# Patient Record
Sex: FEMALE | Race: WHITE | NOT HISPANIC OR LATINO | Employment: PART TIME | URBAN - METROPOLITAN AREA
[De-identification: names, ages, dates, MRNs, and addresses within clinical notes are randomized per-mention and may not be internally consistent; named-entity substitution may affect disease eponyms.]

---

## 2023-02-20 PROBLEM — R73.01 IMPAIRED FASTING GLUCOSE: Status: ACTIVE | Noted: 2023-02-20

## 2023-02-20 PROBLEM — M81.0 POSTMENOPAUSAL BONE LOSS: Status: ACTIVE | Noted: 2023-02-20

## 2023-02-20 PROBLEM — N18.31 STAGE 3A CHRONIC KIDNEY DISEASE (MULTI): Status: ACTIVE | Noted: 2023-02-20

## 2023-02-20 PROBLEM — L65.9 HAIR LOSS: Status: ACTIVE | Noted: 2023-02-20

## 2023-02-20 PROBLEM — M25.551 HIP PAIN, BILATERAL: Status: ACTIVE | Noted: 2023-02-20

## 2023-02-20 PROBLEM — E78.5 HYPERLIPIDEMIA: Status: ACTIVE | Noted: 2023-02-20

## 2023-02-20 PROBLEM — M25.552 HIP PAIN, BILATERAL: Status: ACTIVE | Noted: 2023-02-20

## 2023-02-20 PROBLEM — E03.9 HYPOTHYROIDISM: Status: ACTIVE | Noted: 2023-02-20

## 2023-02-20 PROBLEM — M85.80 MODERATE OSTEOPENIA: Status: ACTIVE | Noted: 2023-02-20

## 2023-02-20 RX ORDER — PRAVASTATIN SODIUM 80 MG/1
1 TABLET ORAL DAILY
COMMUNITY
Start: 2014-03-10 | End: 2023-05-30 | Stop reason: SDUPTHER

## 2023-02-20 RX ORDER — LUTEIN 6 MG
TABLET ORAL
COMMUNITY
Start: 2015-04-14

## 2023-02-20 RX ORDER — OMEGA-3/DHA/EPA/FISH OIL 300-1000MG
CAPSULE,DELAYED RELEASE (ENTERIC COATED) ORAL
COMMUNITY
Start: 2015-04-14

## 2023-02-20 RX ORDER — SPIRONOLACTONE 100 MG/1
1 TABLET, FILM COATED ORAL 2 TIMES DAILY
COMMUNITY
Start: 2014-06-19 | End: 2023-05-12 | Stop reason: SDUPTHER

## 2023-02-20 RX ORDER — CALCIUM CARBONATE/VITAMIN D3 600MG-5MCG
TABLET ORAL
COMMUNITY
Start: 2015-04-14

## 2023-02-20 RX ORDER — LEVOTHYROXINE SODIUM 100 UG/1
TABLET ORAL
COMMUNITY
Start: 2015-04-14 | End: 2023-06-05 | Stop reason: SDUPTHER

## 2023-03-16 ENCOUNTER — OFFICE VISIT (OUTPATIENT)
Dept: PRIMARY CARE | Facility: CLINIC | Age: 73
End: 2023-03-16
Payer: MEDICARE

## 2023-03-16 VITALS
WEIGHT: 180 LBS | SYSTOLIC BLOOD PRESSURE: 132 MMHG | HEIGHT: 64 IN | BODY MASS INDEX: 30.73 KG/M2 | DIASTOLIC BLOOD PRESSURE: 78 MMHG | HEART RATE: 74 BPM

## 2023-03-16 DIAGNOSIS — Z00.00 ROUTINE GENERAL MEDICAL EXAMINATION AT HEALTH CARE FACILITY: ICD-10-CM

## 2023-03-16 DIAGNOSIS — E78.00 PURE HYPERCHOLESTEROLEMIA: Primary | ICD-10-CM

## 2023-03-16 DIAGNOSIS — R73.01 IMPAIRED FASTING GLUCOSE: ICD-10-CM

## 2023-03-16 DIAGNOSIS — E03.9 ACQUIRED HYPOTHYROIDISM: ICD-10-CM

## 2023-03-16 DIAGNOSIS — Z12.11 ENCOUNTER FOR SCREENING FOR MALIGNANT NEOPLASM OF COLON: ICD-10-CM

## 2023-03-16 DIAGNOSIS — N18.31 STAGE 3A CHRONIC KIDNEY DISEASE (MULTI): ICD-10-CM

## 2023-03-16 DIAGNOSIS — Z12.31 VISIT FOR SCREENING MAMMOGRAM: ICD-10-CM

## 2023-03-16 LAB
ALANINE AMINOTRANSFERASE (SGPT) (U/L) IN SER/PLAS: 24 U/L (ref 7–45)
ALBUMIN (G/DL) IN SER/PLAS: 4.4 G/DL (ref 3.4–5)
ALKALINE PHOSPHATASE (U/L) IN SER/PLAS: 78 U/L (ref 33–136)
ANION GAP IN SER/PLAS: 14 MMOL/L (ref 10–20)
ASPARTATE AMINOTRANSFERASE (SGOT) (U/L) IN SER/PLAS: 18 U/L (ref 9–39)
BASOPHILS (10*3/UL) IN BLOOD BY AUTOMATED COUNT: 0.07 X10E9/L (ref 0–0.1)
BASOPHILS/100 LEUKOCYTES IN BLOOD BY AUTOMATED COUNT: 1 % (ref 0–2)
BILIRUBIN TOTAL (MG/DL) IN SER/PLAS: 1.9 MG/DL (ref 0–1.2)
CALCIUM (MG/DL) IN SER/PLAS: 10.1 MG/DL (ref 8.6–10.3)
CARBON DIOXIDE, TOTAL (MMOL/L) IN SER/PLAS: 28 MMOL/L (ref 21–32)
CHLORIDE (MMOL/L) IN SER/PLAS: 103 MMOL/L (ref 98–107)
CHOLESTEROL (MG/DL) IN SER/PLAS: 166 MG/DL (ref 0–199)
CHOLESTEROL IN HDL (MG/DL) IN SER/PLAS: 46.9 MG/DL
CHOLESTEROL/HDL RATIO: 3.5
CREATINE KINASE (U/L) IN SER/PLAS: 44 U/L (ref 0–215)
CREATININE (MG/DL) IN SER/PLAS: 1.13 MG/DL (ref 0.5–1.05)
EOSINOPHILS (10*3/UL) IN BLOOD BY AUTOMATED COUNT: 0.17 X10E9/L (ref 0–0.4)
EOSINOPHILS/100 LEUKOCYTES IN BLOOD BY AUTOMATED COUNT: 2.5 % (ref 0–6)
ERYTHROCYTE DISTRIBUTION WIDTH (RATIO) BY AUTOMATED COUNT: 12.7 % (ref 11.5–14.5)
ERYTHROCYTE MEAN CORPUSCULAR HEMOGLOBIN CONCENTRATION (G/DL) BY AUTOMATED: 32 G/DL (ref 32–36)
ERYTHROCYTE MEAN CORPUSCULAR VOLUME (FL) BY AUTOMATED COUNT: 94 FL (ref 80–100)
ERYTHROCYTES (10*6/UL) IN BLOOD BY AUTOMATED COUNT: 5.31 X10E12/L (ref 4–5.2)
GFR FEMALE: 52 ML/MIN/1.73M2
GLUCOSE (MG/DL) IN SER/PLAS: 113 MG/DL (ref 74–99)
HEMATOCRIT (%) IN BLOOD BY AUTOMATED COUNT: 49.7 % (ref 36–46)
HEMOGLOBIN (G/DL) IN BLOOD: 15.9 G/DL (ref 12–16)
IMMATURE GRANULOCYTES/100 LEUKOCYTES IN BLOOD BY AUTOMATED COUNT: 0.4 % (ref 0–0.9)
LDL: 92 MG/DL (ref 0–99)
LEUKOCYTES (10*3/UL) IN BLOOD BY AUTOMATED COUNT: 6.7 X10E9/L (ref 4.4–11.3)
LYMPHOCYTES (10*3/UL) IN BLOOD BY AUTOMATED COUNT: 1.43 X10E9/L (ref 0.8–3)
LYMPHOCYTES/100 LEUKOCYTES IN BLOOD BY AUTOMATED COUNT: 21.4 % (ref 13–44)
MAGNESIUM (MG/DL) IN SER/PLAS: 2.35 MG/DL (ref 1.6–2.4)
MONOCYTES (10*3/UL) IN BLOOD BY AUTOMATED COUNT: 0.54 X10E9/L (ref 0.05–0.8)
MONOCYTES/100 LEUKOCYTES IN BLOOD BY AUTOMATED COUNT: 8.1 % (ref 2–10)
NEUTROPHILS (10*3/UL) IN BLOOD BY AUTOMATED COUNT: 4.45 X10E9/L (ref 1.6–5.5)
NEUTROPHILS/100 LEUKOCYTES IN BLOOD BY AUTOMATED COUNT: 66.6 % (ref 40–80)
PLATELETS (10*3/UL) IN BLOOD AUTOMATED COUNT: 234 X10E9/L (ref 150–450)
POTASSIUM (MMOL/L) IN SER/PLAS: 4.7 MMOL/L (ref 3.5–5.3)
PROTEIN TOTAL: 6.9 G/DL (ref 6.4–8.2)
SODIUM (MMOL/L) IN SER/PLAS: 140 MMOL/L (ref 136–145)
THYROTROPIN (MIU/L) IN SER/PLAS BY DETECTION LIMIT <= 0.05 MIU/L: 7.05 MIU/L (ref 0.44–3.98)
THYROXINE (T4) FREE (NG/DL) IN SER/PLAS: 1.22 NG/DL (ref 0.61–1.12)
TRIGLYCERIDE (MG/DL) IN SER/PLAS: 134 MG/DL (ref 0–149)
UREA NITROGEN (MG/DL) IN SER/PLAS: 14 MG/DL (ref 6–23)
VLDL: 27 MG/DL (ref 0–40)

## 2023-03-16 PROCEDURE — 99214 OFFICE O/P EST MOD 30 MIN: CPT | Performed by: INTERNAL MEDICINE

## 2023-03-16 PROCEDURE — G0439 PPPS, SUBSEQ VISIT: HCPCS | Performed by: INTERNAL MEDICINE

## 2023-03-16 PROCEDURE — 1036F TOBACCO NON-USER: CPT | Performed by: INTERNAL MEDICINE

## 2023-03-16 PROCEDURE — 1160F RVW MEDS BY RX/DR IN RCRD: CPT | Performed by: INTERNAL MEDICINE

## 2023-03-16 PROCEDURE — 1170F FXNL STATUS ASSESSED: CPT | Performed by: INTERNAL MEDICINE

## 2023-03-16 PROCEDURE — 1159F MED LIST DOCD IN RCRD: CPT | Performed by: INTERNAL MEDICINE

## 2023-03-16 ASSESSMENT — ACTIVITIES OF DAILY LIVING (ADL)
DOING_HOUSEWORK: INDEPENDENT
MANAGING_FINANCES: INDEPENDENT
BATHING: INDEPENDENT
TAKING_MEDICATION: INDEPENDENT
GROCERY_SHOPPING: INDEPENDENT
DRESSING: INDEPENDENT

## 2023-03-16 ASSESSMENT — PATIENT HEALTH QUESTIONNAIRE - PHQ9
2. FEELING DOWN, DEPRESSED OR HOPELESS: NOT AT ALL
SUM OF ALL RESPONSES TO PHQ9 QUESTIONS 1 AND 2: 0
1. LITTLE INTEREST OR PLEASURE IN DOING THINGS: NOT AT ALL

## 2023-03-16 NOTE — PROGRESS NOTES
"Subjective   Patient ID: Yanelis Lagunas is a 72 y.o. female who presents for Medicare Annual Wellness Visit Subsequent.    Her hair is not getting any thicker, is on spironolactone but not helping, can she use rogaine  Her sister also has hair loss  She has a laser she uses    Energy level is good  No cp or pressure  No sob or mills, she is not exercising like she used to due to her bursitis. She had to  line for 2 hours last weekend and her buttocks and hip hurt  Bowels are regular  No urinary issues.            Review of Systems    Objective   /78   Pulse 74   Ht 1.631 m (5' 4.2\")   Wt 81.6 kg (180 lb)   BMI 30.70 kg/m²     Physical Exam  Constitutional:       Appearance: Normal appearance.   HENT:      Head: Normocephalic.   Neck:      Vascular: No carotid bruit.   Cardiovascular:      Rate and Rhythm: Normal rate and regular rhythm.      Heart sounds: No murmur heard.  Musculoskeletal:      Cervical back: No tenderness.      Right lower leg: No edema.      Left lower leg: No edema.   Lymphadenopathy:      Cervical: No cervical adenopathy.   Skin:     General: Skin is warm and dry.   Neurological:      Mental Status: She is alert.   Psychiatric:         Mood and Affect: Mood normal.         Behavior: Behavior normal.         Thought Content: Thought content normal.         Assessment/Plan          "

## 2023-03-16 NOTE — PATIENT INSTRUCTIONS
Colon screen, check to see if you had stool test from insurer and if not, do the test given today    Call [phone] to schedule mammogram at [facility]    Bp is good    Hair loss, checking labs on spironolactone- discussed rogaine for women, if not better, to dermatology    Hypothyroid, get lab/tsh    Cholesterol, on pravastatin, checking labs    If labs good would recheck in 1 year    Declines all vaccinations

## 2023-03-17 LAB
ESTIMATED AVERAGE GLUCOSE FOR HBA1C: 123 MG/DL
HEMOGLOBIN A1C/HEMOGLOBIN TOTAL IN BLOOD: 5.9 %

## 2023-05-12 DIAGNOSIS — N18.31 STAGE 3A CHRONIC KIDNEY DISEASE (MULTI): ICD-10-CM

## 2023-05-12 NOTE — TELEPHONE ENCOUNTER
Requested Prescriptions     Pending Prescriptions Disp Refills    spironolactone (Aldactone) 100 mg tablet 180 tablet 1     Sig: Take 1 tablet (100 mg) by mouth 2 times a day.

## 2023-05-15 RX ORDER — SPIRONOLACTONE 100 MG/1
100 TABLET, FILM COATED ORAL 2 TIMES DAILY
Qty: 180 TABLET | Refills: 1 | Status: SHIPPED | OUTPATIENT
Start: 2023-05-15 | End: 2023-10-25

## 2023-05-30 DIAGNOSIS — E78.5 HYPERLIPIDEMIA, UNSPECIFIED HYPERLIPIDEMIA TYPE: ICD-10-CM

## 2023-05-30 NOTE — TELEPHONE ENCOUNTER
Requested Prescriptions     Pending Prescriptions Disp Refills    pravastatin (Pravachol) 80 mg tablet 90 tablet 2     Sig: Take 1 tablet (80 mg) by mouth once daily.

## 2023-05-31 RX ORDER — PRAVASTATIN SODIUM 80 MG/1
80 TABLET ORAL DAILY
Qty: 90 TABLET | Refills: 2 | Status: SHIPPED | OUTPATIENT
Start: 2023-05-31 | End: 2024-04-04 | Stop reason: SDUPTHER

## 2023-06-05 DIAGNOSIS — E03.9 HYPOTHYROIDISM, UNSPECIFIED TYPE: ICD-10-CM

## 2023-06-05 RX ORDER — LEVOTHYROXINE SODIUM 100 UG/1
100 TABLET ORAL SEE ADMIN INSTRUCTIONS
Qty: 94 TABLET | Refills: 1 | Status: SHIPPED | OUTPATIENT
Start: 2023-06-05 | End: 2023-11-17

## 2023-06-05 NOTE — TELEPHONE ENCOUNTER
Requested Prescriptions     Pending Prescriptions Disp Refills    levothyroxine (Synthroid, Levoxyl) 100 mcg tablet 94 tablet 1     Sig: Take 1 tablet (100 mcg) by mouth see administration instructions. take 1 tablet by mouth once daily except 1 day weekly just take 1/2 tab

## 2023-10-25 DIAGNOSIS — N18.31 STAGE 3A CHRONIC KIDNEY DISEASE (MULTI): ICD-10-CM

## 2023-10-25 RX ORDER — SPIRONOLACTONE 100 MG/1
100 TABLET, FILM COATED ORAL 2 TIMES DAILY
Qty: 180 TABLET | Refills: 1 | Status: SHIPPED | OUTPATIENT
Start: 2023-10-25 | End: 2024-05-14 | Stop reason: SDUPTHER

## 2023-11-16 DIAGNOSIS — E03.9 HYPOTHYROIDISM, UNSPECIFIED TYPE: ICD-10-CM

## 2023-11-17 RX ORDER — LEVOTHYROXINE SODIUM 100 UG/1
TABLET ORAL
Qty: 94 TABLET | Refills: 1 | Status: SHIPPED | OUTPATIENT
Start: 2023-11-17 | End: 2024-05-14 | Stop reason: SDUPTHER

## 2024-04-04 DIAGNOSIS — E78.5 HYPERLIPIDEMIA, UNSPECIFIED HYPERLIPIDEMIA TYPE: ICD-10-CM

## 2024-04-05 DIAGNOSIS — M81.0 POSTMENOPAUSAL BONE LOSS: ICD-10-CM

## 2024-04-05 DIAGNOSIS — N18.31 STAGE 3A CHRONIC KIDNEY DISEASE (MULTI): ICD-10-CM

## 2024-04-05 DIAGNOSIS — E03.9 ACQUIRED HYPOTHYROIDISM: ICD-10-CM

## 2024-04-05 DIAGNOSIS — E78.00 PURE HYPERCHOLESTEROLEMIA: ICD-10-CM

## 2024-04-05 DIAGNOSIS — R73.01 IMPAIRED FASTING GLUCOSE: Primary | ICD-10-CM

## 2024-04-05 RX ORDER — PRAVASTATIN SODIUM 80 MG/1
80 TABLET ORAL DAILY
Qty: 90 TABLET | Refills: 0 | Status: SHIPPED | OUTPATIENT
Start: 2024-04-05

## 2024-05-14 DIAGNOSIS — E03.9 HYPOTHYROIDISM, UNSPECIFIED TYPE: ICD-10-CM

## 2024-05-14 DIAGNOSIS — N18.31 STAGE 3A CHRONIC KIDNEY DISEASE (MULTI): ICD-10-CM

## 2024-05-14 RX ORDER — SPIRONOLACTONE 100 MG/1
100 TABLET, FILM COATED ORAL 2 TIMES DAILY
Qty: 180 TABLET | Refills: 2 | Status: SHIPPED | OUTPATIENT
Start: 2024-05-14 | End: 2024-05-23

## 2024-05-14 RX ORDER — LEVOTHYROXINE SODIUM 100 UG/1
TABLET ORAL
Qty: 94 TABLET | Refills: 2 | Status: SHIPPED | OUTPATIENT
Start: 2024-05-14

## 2024-05-14 NOTE — TELEPHONE ENCOUNTER
Requested Prescriptions     Pending Prescriptions Disp Refills    spironolactone (Aldactone) 100 mg tablet 180 tablet 2     Sig: Take 1 tablet (100 mg) by mouth 2 times a day.    levothyroxine (Synthroid, Levoxyl) 100 mcg tablet 94 tablet 2     Sig: SEE ADMINISTRATION INSTRUCTIONS. TAKE 1 TABLET BY MOUTH ONCE DAILY EXCEPT 1 DAY WEEKLY JUST TAKE 1/2 TABLET.

## 2024-05-21 ENCOUNTER — LAB (OUTPATIENT)
Dept: LAB | Facility: LAB | Age: 74
End: 2024-05-21
Payer: MEDICARE

## 2024-05-21 DIAGNOSIS — R73.01 IMPAIRED FASTING GLUCOSE: ICD-10-CM

## 2024-05-21 DIAGNOSIS — N18.31 STAGE 3A CHRONIC KIDNEY DISEASE (MULTI): ICD-10-CM

## 2024-05-21 DIAGNOSIS — E03.9 ACQUIRED HYPOTHYROIDISM: ICD-10-CM

## 2024-05-21 DIAGNOSIS — M81.0 POSTMENOPAUSAL BONE LOSS: ICD-10-CM

## 2024-05-21 DIAGNOSIS — E78.00 PURE HYPERCHOLESTEROLEMIA: ICD-10-CM

## 2024-05-21 LAB
25(OH)D3 SERPL-MCNC: 59 NG/ML (ref 30–100)
ALBUMIN SERPL BCP-MCNC: 4.4 G/DL (ref 3.4–5)
ALP SERPL-CCNC: 67 U/L (ref 33–136)
ALT SERPL W P-5'-P-CCNC: 27 U/L (ref 7–45)
ANION GAP SERPL CALC-SCNC: 15 MMOL/L (ref 10–20)
AST SERPL W P-5'-P-CCNC: 21 U/L (ref 9–39)
BASOPHILS # BLD AUTO: 0.08 X10*3/UL (ref 0–0.1)
BASOPHILS NFR BLD AUTO: 1.3 %
BILIRUB SERPL-MCNC: 1.9 MG/DL (ref 0–1.2)
BUN SERPL-MCNC: 14 MG/DL (ref 6–23)
CALCIUM SERPL-MCNC: 9.8 MG/DL (ref 8.6–10.3)
CHLORIDE SERPL-SCNC: 105 MMOL/L (ref 98–107)
CHOLEST SERPL-MCNC: 150 MG/DL (ref 0–199)
CHOLESTEROL/HDL RATIO: 3.3
CK SERPL-CCNC: 44 U/L (ref 0–215)
CO2 SERPL-SCNC: 24 MMOL/L (ref 21–32)
CREAT SERPL-MCNC: 1.1 MG/DL (ref 0.5–1.05)
EGFRCR SERPLBLD CKD-EPI 2021: 53 ML/MIN/1.73M*2
EOSINOPHIL # BLD AUTO: 0.39 X10*3/UL (ref 0–0.4)
EOSINOPHIL NFR BLD AUTO: 6.3 %
ERYTHROCYTE [DISTWIDTH] IN BLOOD BY AUTOMATED COUNT: 12.3 % (ref 11.5–14.5)
EST. AVERAGE GLUCOSE BLD GHB EST-MCNC: 126 MG/DL
GLUCOSE SERPL-MCNC: 118 MG/DL (ref 74–99)
HBA1C MFR BLD: 6 %
HCT VFR BLD AUTO: 47.9 % (ref 36–46)
HDLC SERPL-MCNC: 45.5 MG/DL
HGB BLD-MCNC: 15.8 G/DL (ref 12–16)
IMM GRANULOCYTES # BLD AUTO: 0.04 X10*3/UL (ref 0–0.5)
IMM GRANULOCYTES NFR BLD AUTO: 0.6 % (ref 0–0.9)
LDLC SERPL CALC-MCNC: 80 MG/DL
LYMPHOCYTES # BLD AUTO: 1.57 X10*3/UL (ref 0.8–3)
LYMPHOCYTES NFR BLD AUTO: 25.4 %
MAGNESIUM SERPL-MCNC: 2.3 MG/DL (ref 1.6–2.4)
MCH RBC QN AUTO: 30.3 PG (ref 26–34)
MCHC RBC AUTO-ENTMCNC: 33 G/DL (ref 32–36)
MCV RBC AUTO: 92 FL (ref 80–100)
MONOCYTES # BLD AUTO: 0.59 X10*3/UL (ref 0.05–0.8)
MONOCYTES NFR BLD AUTO: 9.5 %
NEUTROPHILS # BLD AUTO: 3.51 X10*3/UL (ref 1.6–5.5)
NEUTROPHILS NFR BLD AUTO: 56.9 %
NON HDL CHOLESTEROL: 105 MG/DL (ref 0–149)
NRBC BLD-RTO: 0 /100 WBCS (ref 0–0)
PLATELET # BLD AUTO: 232 X10*3/UL (ref 150–450)
POTASSIUM SERPL-SCNC: 4.2 MMOL/L (ref 3.5–5.3)
PROT SERPL-MCNC: 6.7 G/DL (ref 6.4–8.2)
RBC # BLD AUTO: 5.21 X10*6/UL (ref 4–5.2)
SODIUM SERPL-SCNC: 140 MMOL/L (ref 136–145)
TRIGL SERPL-MCNC: 124 MG/DL (ref 0–149)
TSH SERPL-ACNC: 1.98 MIU/L (ref 0.44–3.98)
VLDL: 25 MG/DL (ref 0–40)
WBC # BLD AUTO: 6.2 X10*3/UL (ref 4.4–11.3)

## 2024-05-21 PROCEDURE — 84443 ASSAY THYROID STIM HORMONE: CPT

## 2024-05-21 PROCEDURE — 83735 ASSAY OF MAGNESIUM: CPT

## 2024-05-21 PROCEDURE — 82550 ASSAY OF CK (CPK): CPT

## 2024-05-21 PROCEDURE — 80053 COMPREHEN METABOLIC PANEL: CPT

## 2024-05-21 PROCEDURE — 80061 LIPID PANEL: CPT

## 2024-05-21 PROCEDURE — 82306 VITAMIN D 25 HYDROXY: CPT

## 2024-05-21 PROCEDURE — 83036 HEMOGLOBIN GLYCOSYLATED A1C: CPT

## 2024-05-21 PROCEDURE — 36415 COLL VENOUS BLD VENIPUNCTURE: CPT

## 2024-05-21 PROCEDURE — 85025 COMPLETE CBC W/AUTO DIFF WBC: CPT

## 2024-05-23 ENCOUNTER — OFFICE VISIT (OUTPATIENT)
Dept: PRIMARY CARE | Facility: CLINIC | Age: 74
End: 2024-05-23
Payer: MEDICARE

## 2024-05-23 VITALS
DIASTOLIC BLOOD PRESSURE: 86 MMHG | HEART RATE: 66 BPM | BODY MASS INDEX: 30.73 KG/M2 | HEIGHT: 64 IN | WEIGHT: 180 LBS | SYSTOLIC BLOOD PRESSURE: 127 MMHG

## 2024-05-23 DIAGNOSIS — M25.551 RIGHT HIP PAIN: ICD-10-CM

## 2024-05-23 DIAGNOSIS — E78.00 PURE HYPERCHOLESTEROLEMIA: ICD-10-CM

## 2024-05-23 DIAGNOSIS — M81.0 POSTMENOPAUSAL BONE LOSS: ICD-10-CM

## 2024-05-23 DIAGNOSIS — Z00.00 ROUTINE GENERAL MEDICAL EXAMINATION AT HEALTH CARE FACILITY: Primary | ICD-10-CM

## 2024-05-23 DIAGNOSIS — Z12.31 VISIT FOR SCREENING MAMMOGRAM: ICD-10-CM

## 2024-05-23 DIAGNOSIS — R73.01 IMPAIRED FASTING GLUCOSE: ICD-10-CM

## 2024-05-23 DIAGNOSIS — N18.31 CKD STAGE 3A, GFR 45-59 ML/MIN (MULTI): ICD-10-CM

## 2024-05-23 DIAGNOSIS — N18.31 STAGE 3A CHRONIC KIDNEY DISEASE (MULTI): ICD-10-CM

## 2024-05-23 DIAGNOSIS — E03.9 ACQUIRED HYPOTHYROIDISM: ICD-10-CM

## 2024-05-23 DIAGNOSIS — R60.0 PEDAL EDEMA: ICD-10-CM

## 2024-05-23 LAB
POC APPEARANCE, URINE: CLEAR
POC BILIRUBIN, URINE: NEGATIVE
POC BLOOD, URINE: NEGATIVE
POC COLOR, URINE: YELLOW
POC GLUCOSE, URINE: NEGATIVE MG/DL
POC KETONES, URINE: NEGATIVE MG/DL
POC LEUKOCYTES, URINE: ABNORMAL
POC NITRITE,URINE: NEGATIVE
POC PH, URINE: 6 PH
POC PROTEIN, URINE: NEGATIVE MG/DL
POC SPECIFIC GRAVITY, URINE: 1.02
POC UROBILINOGEN, URINE: 0.2 EU/DL

## 2024-05-23 PROCEDURE — 81003 URINALYSIS AUTO W/O SCOPE: CPT | Performed by: INTERNAL MEDICINE

## 2024-05-23 PROCEDURE — 1170F FXNL STATUS ASSESSED: CPT | Performed by: INTERNAL MEDICINE

## 2024-05-23 PROCEDURE — 1036F TOBACCO NON-USER: CPT | Performed by: INTERNAL MEDICINE

## 2024-05-23 PROCEDURE — 93000 ELECTROCARDIOGRAM COMPLETE: CPT | Performed by: INTERNAL MEDICINE

## 2024-05-23 PROCEDURE — 1160F RVW MEDS BY RX/DR IN RCRD: CPT | Performed by: INTERNAL MEDICINE

## 2024-05-23 PROCEDURE — 1159F MED LIST DOCD IN RCRD: CPT | Performed by: INTERNAL MEDICINE

## 2024-05-23 PROCEDURE — G0439 PPPS, SUBSEQ VISIT: HCPCS | Performed by: INTERNAL MEDICINE

## 2024-05-23 PROCEDURE — 99214 OFFICE O/P EST MOD 30 MIN: CPT | Performed by: INTERNAL MEDICINE

## 2024-05-23 PROCEDURE — 1124F ACP DISCUSS-NO DSCNMKR DOCD: CPT | Performed by: INTERNAL MEDICINE

## 2024-05-23 RX ORDER — SPIRONOLACTONE 100 MG/1
100 TABLET, FILM COATED ORAL DAILY
Start: 2024-05-23

## 2024-05-23 ASSESSMENT — ACTIVITIES OF DAILY LIVING (ADL)
DOING_HOUSEWORK: INDEPENDENT
BATHING: INDEPENDENT
DRESSING: INDEPENDENT
TAKING_MEDICATION: INDEPENDENT
GROCERY_SHOPPING: INDEPENDENT
MANAGING_FINANCES: INDEPENDENT

## 2024-05-23 ASSESSMENT — PATIENT HEALTH QUESTIONNAIRE - PHQ9: 2. FEELING DOWN, DEPRESSED OR HOPELESS: NOT AT ALL

## 2024-05-23 NOTE — PROGRESS NOTES
"Subjective   Reason for Visit: Yanelis Lagunas is an 73 y.o. female here for a Medicare Wellness visit.     Past Medical, Surgical, and Family History reviewed and updated in chart.    Reviewed all medications by prescribing practitioner or clinical pharmacist (such as prescriptions, OTCs, herbal therapies and supplements) and documented in the medical record.    She has thin skin, is there anything she can do for it  What about estrogen  She gets bruisy spots on her hands    She gets bursitis and it gets worse on the right side  Now that she is more active. Is better in her right hip    She has edema in her feet, gets worse.   Has only been like that in the last month  She did cut grass and her arms have been more itchy  Her nose has been more congested.     She does not salt anything but eating more pepperoni, more on her left  No history of blood clot    No cp or pressure  Does her yard work  No LH or palpitations  Bowels are regular  No urinary issues  No sob  Mood is good.   She does a lot of research for her dghtr. And nutrition.     She doesn't want any shots, she does not want to put that stuff in her.             Patient Care Team:  Maryan Roberts DO as PCP - General  Maryan Roberts DO as PCP - [employer] PCP     Review of Systems    Objective   Vitals:  /86   Pulse 66   Ht 1.631 m (5' 4.2\")   Wt 81.6 kg (180 lb)   BMI 30.70 kg/m²       Physical Exam  Constitutional:       Appearance: Normal appearance.   HENT:      Nose:      Comments: Turbs mildly pale and edematous, no purulence  Neck:      Vascular: No carotid bruit.   Cardiovascular:      Rate and Rhythm: Normal rate. Rhythm irregular.      Heart sounds: No murmur heard.  Pulmonary:      Effort: Pulmonary effort is normal.      Breath sounds: Normal breath sounds.   Chest:   Breasts:     Right: No mass.      Left: No mass.   Abdominal:      General: Abdomen is flat.      Palpations: Abdomen is soft. There is no mass. "      Tenderness: There is no abdominal tenderness. There is no guarding.      Comments: No HSM or pulsatile mass   Musculoskeletal:      Right lower leg: Edema present.      Left lower leg: Edema (2-3+ pedal edema) present.      Comments: Hip/ART  Marked decrease ext rotation on the right to about 30deg, left is normal to about 80   Lymphadenopathy:      Cervical: No cervical adenopathy.      Upper Body:      Right upper body: No supraclavicular or axillary adenopathy.      Left upper body: No supraclavicular or axillary adenopathy.      Lower Body: No right inguinal adenopathy. No left inguinal adenopathy.   Skin:     Coloration: Skin is not jaundiced or pale.   Neurological:      Mental Status: She is alert and oriented to person, place, and time.   Psychiatric:         Mood and Affect: Mood normal.         Behavior: Behavior normal.         Assessment/Plan   Problem List Items Addressed This Visit    None  Visit Diagnoses       Routine general medical examination at health care facility    -  Primary            Patient Instructions   Leg swelling  Checking EKG to make sure no afib (heart is slightly irregular)  Checking urine for protein  Wear compression hose  If not better to see vascular/vein doc    Thin skin, no treatment, make sure taking in protein    Hair loss,   On spironolactone 100mg twice daily but is drying out your kidneys, cut to 1 daily    Call [phone] to schedule mammogram and bone density    Colon screen, getting annual stool test from insurer, was normal in September 2023    Hypothyroidism, tsh normal, continue same dose    Prediabetes- stable, continue diet and exercise    High cholesterol is well controlled, continue pravastatin    Right hip pain, on exam is consistent with arthritis as it does not rotate out, but not limiting your functioning, if getting worse would get xray and have you see orthopedics.     Declines vaccinations    Recheck in 1 year and as needed       Bilateral expiratory wheeze.  Good air movement however.  No retractions.  Speaking full sentences, and singing.  No other focal signs of infection.  Jack Knutson DO. Bilateral expiratory wheeze.  Good air movement however.  No retractions.  Speaking full sentences, and singing.  No other focal signs of infection.  Tachycardic, regular rhythm, no murmur. Jack Knutson DO.

## 2024-05-23 NOTE — PROGRESS NOTES
Subjective   Patient ID: Yanelis Lagunas is a 73 y.o. female who presents for Medicare Annual Wellness Visit Subsequent.    HPI     Review of Systems    Objective   There were no vitals taken for this visit.    Physical Exam    Assessment/Plan

## 2024-05-23 NOTE — ASSESSMENT & PLAN NOTE
Chronic, stable, renal ultrasound normal. Due to spironolactone, decreasing to 100/day, check annually

## 2024-05-23 NOTE — PATIENT INSTRUCTIONS
Leg swelling  Checking EKG to make sure no afib (heart is slightly irregular)  Checking urine for protein  Wear compression hose  If not better to see vascular/vein doc    Thin skin, no treatment, make sure taking in protein    Hair loss,   On spironolactone 100mg twice daily but is drying out your kidneys, cut to 1 daily    Call [phone] to schedule mammogram and bone density    Colon screen, getting annual stool test from insurer, was normal in September 2023    Hypothyroidism, tsh normal, continue same dose    Prediabetes- stable, continue diet and exercise    High cholesterol is well controlled, continue pravastatin    Right hip pain, on exam is consistent with arthritis as it does not rotate out, but not limiting your functioning, if getting worse would get xray and have you see orthopedics.     Declines vaccinations    Recheck in 1 year and as needed

## 2024-06-26 DIAGNOSIS — E78.5 HYPERLIPIDEMIA, UNSPECIFIED HYPERLIPIDEMIA TYPE: ICD-10-CM

## 2024-06-27 RX ORDER — PRAVASTATIN SODIUM 80 MG/1
80 TABLET ORAL DAILY
Qty: 90 TABLET | Refills: 1 | Status: SHIPPED | OUTPATIENT
Start: 2024-06-27

## 2024-07-04 DIAGNOSIS — E03.9 HYPOTHYROIDISM, UNSPECIFIED TYPE: ICD-10-CM

## 2024-07-05 RX ORDER — LEVOTHYROXINE SODIUM 100 UG/1
TABLET ORAL
Qty: 94 TABLET | Refills: 1 | Status: SHIPPED | OUTPATIENT
Start: 2024-07-05

## 2024-07-16 ENCOUNTER — HOSPITAL ENCOUNTER (OUTPATIENT)
Dept: RADIOLOGY | Facility: HOSPITAL | Age: 74
Discharge: HOME | End: 2024-07-16
Payer: MEDICARE

## 2024-07-16 VITALS — BODY MASS INDEX: 30.56 KG/M2 | WEIGHT: 179 LBS | HEIGHT: 64 IN

## 2024-07-16 DIAGNOSIS — M81.0 POSTMENOPAUSAL BONE LOSS: ICD-10-CM

## 2024-07-16 DIAGNOSIS — Z12.31 VISIT FOR SCREENING MAMMOGRAM: ICD-10-CM

## 2024-07-16 PROCEDURE — 77067 SCR MAMMO BI INCL CAD: CPT | Performed by: RADIOLOGY

## 2024-07-16 PROCEDURE — 77063 BREAST TOMOSYNTHESIS BI: CPT

## 2024-07-16 PROCEDURE — 77063 BREAST TOMOSYNTHESIS BI: CPT | Performed by: RADIOLOGY

## 2024-07-16 PROCEDURE — 77080 DXA BONE DENSITY AXIAL: CPT

## 2024-07-16 ASSESSMENT — LIFESTYLE VARIABLES
CURRENT_SMOKER: N
3_OR_MORE_DRINKS_PER_DAY: N

## 2024-09-17 DIAGNOSIS — N18.31 STAGE 3A CHRONIC KIDNEY DISEASE (MULTI): ICD-10-CM

## 2024-09-17 RX ORDER — SPIRONOLACTONE 100 MG/1
100 TABLET, FILM COATED ORAL 2 TIMES DAILY
Qty: 180 TABLET | Refills: 1 | Status: SHIPPED | OUTPATIENT
Start: 2024-09-17

## 2024-12-18 DIAGNOSIS — E78.5 HYPERLIPIDEMIA, UNSPECIFIED HYPERLIPIDEMIA TYPE: ICD-10-CM

## 2024-12-19 RX ORDER — PRAVASTATIN SODIUM 80 MG/1
80 TABLET ORAL DAILY
Qty: 90 TABLET | Refills: 1 | Status: SHIPPED | OUTPATIENT
Start: 2024-12-19

## 2025-03-27 NOTE — PROGRESS NOTES
Subjective   Reason for Visit: Yanelis Lagunas is an 74 y.o. female here for a annual [facility] visit and medicare wellness.           HPI  Yanelis       Visit date not found    Review of current and chronic medical health conditions:    #Hypothyroid  TSH 1.98 5/21/24  Levothyroxine 100 mcg daily    #Hyperlipidemia  Pravastatin 80 mg daily  LDL 80 5/21/24    #CKD stage 3a    eGFR 53  5/21/24  Negative proteinuria 5/21/24    Right hip pain  Xray and ortho if still bothers        Declines vaccinations     Recheck in 1 year and as needed   #CPE   Occupation: retired      Do you take any herbs or supplements that were not prescribed by a doctor?   Are you taking calcium supplements?   Are you taking aspirin daily?    Colon Cancer Screening: next colonoscopy 2030        Mammogram: order       Last eye exam: December    Last dental Exam: few years  Exercise:stuff around house, cut grows  Mood: good  Sleep: good  Vaccines: does not get any-declines      Health Maintenance Due   Topic Date Due   • Hepatitis C Screening  Never done   • CKD: Urine Protein Screening  Never done   • DTaP/Tdap/Td Vaccines (1 - Tdap) Never done   • Pneumococcal Vaccine (1 of 1 - PCV) Never done   • Zoster Vaccines (1 of 2) Never done   • Influenza Vaccine (1) Never done   • COVID-19 Vaccine (1 - 2024-25 season) Never done   • TSH Level  05/21/2025   • Diabetes: Hemoglobin A1C  05/21/2025   • Medicare Annual Wellness Visit (AWV)  05/24/2025       No Known Allergies            No visits with results within 60 Day(s) from this visit.   Latest known visit with results is:   Office Visit on 05/23/2024   Component Date Value Ref Range Status   • POC Color, Urine 05/23/2024 Yellow  Straw, Yellow, Light-Yellow Final   • POC Appearance, Urine 05/23/2024 Clear  Clear Final   • POC Glucose, Urine 05/23/2024 NEGATIVE  NEGATIVE mg/dl Final   • POC Bilirubin, Urine 05/23/2024 NEGATIVE  NEGATIVE Final   • POC Ketones, Urine 05/23/2024 NEGATIVE  NEGATIVE  mg/dl Final   • POC Specific Gravity, Urine 05/23/2024 1.025  1.005 - 1.035 Final   • POC Blood, Urine 05/23/2024 NEGATIVE  NEGATIVE Final   • POC PH, Urine 05/23/2024 6.0  No Reference Range Established PH Final   • POC Protein, Urine 05/23/2024 NEGATIVE  NEGATIVE, 30 (1+) mg/dl Final   • POC Urobilinogen, Urine 05/23/2024 0.2  0.2, 1.0 EU/DL Final   • Poc Nitrite, Urine 05/23/2024 NEGATIVE  NEGATIVE Final   • POC Leukocytes, Urine 05/23/2024 TRACE (A)  NEGATIVE Final         Patient Care Team:  MADY Martinez-CNP as PCP - General (Internal Medicine)  Maryan Roberts DO as PCP - [employer] PCP     Review of Systems    Objective   Vitals:  Pulse 54   Temp 36.8 °C (98.3 °F)   Wt 81.1 kg (178 lb 12.8 oz)   SpO2 98%   BMI 30.69 kg/m²       Past Surgical History:   Procedure Laterality Date   • BREAST BIOPSY  04/14/2015    Biopsy Breast Open   • TUBAL LIGATION  04/14/2015    Tubal Ligation       Current Outpatient Medications   Medication Instructions   • calcium carbonate-vitamin D3 600 mg-5 mcg (200 unit) tablet Take by mouth.   • eye vitamin supplement (Ocuvite Eye Health Formula) capsule 1 capsule, Daily   • folic acid/multivit-min/lutein (CENTRUM SILVER ORAL) Take by mouth.   • levothyroxine (Synthroid, Levoxyl) 100 mcg tablet TAKE 1 TABLET BY MOUTH ONCE DAILY EXCEPT 1 DAY WEEKLY JUST TAKE 1/2 TABLET.   • omega 3-dha-epa-fish oil 300-1,000 mg capsule,delayed release(DR/EC) Take by mouth.   • pravastatin (PRAVACHOL) 80 mg, oral, Daily   • spironolactone (ALDACTONE) 100 mg, oral, 2 times daily   • vitamin E acid succinate (vitamin E succinate) 268 mg (400 unit) tablet Take by mouth.       Physical Exam    Assessment & Plan  Routine general medical examination at health care facility         Right hip pain         CKD stage 3a, GFR 45-59 ml/min (Multi)         Visit for screening mammogram

## 2025-03-28 ENCOUNTER — APPOINTMENT (OUTPATIENT)
Dept: PRIMARY CARE | Facility: CLINIC | Age: 75
End: 2025-03-28
Payer: MEDICARE

## 2025-03-28 VITALS
WEIGHT: 178.8 LBS | OXYGEN SATURATION: 98 % | BODY MASS INDEX: 30.69 KG/M2 | HEART RATE: 54 BPM | DIASTOLIC BLOOD PRESSURE: 80 MMHG | TEMPERATURE: 98.3 F | SYSTOLIC BLOOD PRESSURE: 146 MMHG

## 2025-03-28 DIAGNOSIS — E03.8 OTHER SPECIFIED HYPOTHYROIDISM: ICD-10-CM

## 2025-03-28 DIAGNOSIS — N18.31 CKD STAGE 3A, GFR 45-59 ML/MIN (MULTI): ICD-10-CM

## 2025-03-28 DIAGNOSIS — M25.551 RIGHT HIP PAIN: ICD-10-CM

## 2025-03-28 DIAGNOSIS — E78.5 HYPERLIPIDEMIA, UNSPECIFIED HYPERLIPIDEMIA TYPE: ICD-10-CM

## 2025-03-28 DIAGNOSIS — Z12.31 ENCOUNTER FOR SCREENING MAMMOGRAM FOR BREAST CANCER: ICD-10-CM

## 2025-03-28 DIAGNOSIS — Z12.12 SCREENING FOR COLORECTAL CANCER: ICD-10-CM

## 2025-03-28 DIAGNOSIS — Z12.31 VISIT FOR SCREENING MAMMOGRAM: ICD-10-CM

## 2025-03-28 DIAGNOSIS — Z00.00 ROUTINE GENERAL MEDICAL EXAMINATION AT HEALTH CARE FACILITY: Primary | ICD-10-CM

## 2025-03-28 DIAGNOSIS — Z13.220 SCREENING FOR HYPERLIPIDEMIA: ICD-10-CM

## 2025-03-28 DIAGNOSIS — N18.31 STAGE 3A CHRONIC KIDNEY DISEASE (MULTI): ICD-10-CM

## 2025-03-28 DIAGNOSIS — Z12.11 SCREENING FOR COLORECTAL CANCER: ICD-10-CM

## 2025-03-28 DIAGNOSIS — Z13.1 DIABETES MELLITUS SCREENING: ICD-10-CM

## 2025-03-28 LAB
ALBUMIN SERPL-MCNC: 4.6 G/DL (ref 3.6–5.1)
ALBUMIN/CREAT UR: NORMAL
ALP SERPL-CCNC: 71 U/L (ref 37–153)
ALT SERPL-CCNC: 25 U/L (ref 6–29)
ANION GAP SERPL CALCULATED.4IONS-SCNC: 11 MMOL/L (CALC) (ref 7–17)
AST SERPL-CCNC: 20 U/L (ref 10–35)
BILIRUB SERPL-MCNC: 1.8 MG/DL (ref 0.2–1.2)
BUN SERPL-MCNC: 15 MG/DL (ref 7–25)
CALCIUM SERPL-MCNC: 10 MG/DL (ref 8.6–10.4)
CHLORIDE SERPL-SCNC: 102 MMOL/L (ref 98–110)
CHOLEST SERPL-MCNC: 158 MG/DL
CHOLEST/HDLC SERPL: 3.2 (CALC)
CO2 SERPL-SCNC: 26 MMOL/L (ref 20–32)
CREAT SERPL-MCNC: 1.09 MG/DL (ref 0.6–1)
CREAT UR-MCNC: NORMAL MG/DL
EGFRCR SERPLBLD CKD-EPI 2021: 54 ML/MIN/1.73M2
GLUCOSE SERPL-MCNC: 116 MG/DL (ref 65–99)
HDLC SERPL-MCNC: 50 MG/DL
LDLC SERPL CALC-MCNC: 86 MG/DL (CALC)
MICROALBUMIN UR-MCNC: NORMAL
NONHDLC SERPL-MCNC: 108 MG/DL (CALC)
POTASSIUM SERPL-SCNC: 4.5 MMOL/L (ref 3.5–5.3)
PROT SERPL-MCNC: 7.2 G/DL (ref 6.1–8.1)
SODIUM SERPL-SCNC: 139 MMOL/L (ref 135–146)
TRIGL SERPL-MCNC: 127 MG/DL

## 2025-03-28 PROCEDURE — 1036F TOBACCO NON-USER: CPT | Performed by: NURSE PRACTITIONER

## 2025-03-28 PROCEDURE — G2211 COMPLEX E/M VISIT ADD ON: HCPCS | Performed by: NURSE PRACTITIONER

## 2025-03-28 PROCEDURE — 1124F ACP DISCUSS-NO DSCNMKR DOCD: CPT | Performed by: NURSE PRACTITIONER

## 2025-03-28 PROCEDURE — 1170F FXNL STATUS ASSESSED: CPT | Performed by: NURSE PRACTITIONER

## 2025-03-28 PROCEDURE — 1160F RVW MEDS BY RX/DR IN RCRD: CPT | Performed by: NURSE PRACTITIONER

## 2025-03-28 PROCEDURE — 1159F MED LIST DOCD IN RCRD: CPT | Performed by: NURSE PRACTITIONER

## 2025-03-28 PROCEDURE — 99214 OFFICE O/P EST MOD 30 MIN: CPT | Performed by: NURSE PRACTITIONER

## 2025-03-28 RX ORDER — SPIRONOLACTONE 100 MG/1
100 TABLET, FILM COATED ORAL 2 TIMES DAILY
Qty: 180 TABLET | Refills: 1 | Status: SHIPPED | OUTPATIENT
Start: 2025-03-28

## 2025-03-28 RX ORDER — PRAVASTATIN SODIUM 80 MG/1
80 TABLET ORAL DAILY
Qty: 90 TABLET | Refills: 1 | Status: SHIPPED | OUTPATIENT
Start: 2025-03-28

## 2025-03-28 ASSESSMENT — ENCOUNTER SYMPTOMS
ABDOMINAL PAIN: 0
ACTIVITY CHANGE: 0
FEVER: 0
BLOOD IN STOOL: 0
DIFFICULTY URINATING: 0
COUGH: 0
CHILLS: 0
DIZZINESS: 0
WEAKNESS: 0
SEIZURES: 0
SPEECH DIFFICULTY: 0
NAUSEA: 0
CONSTIPATION: 0
DECREASED CONCENTRATION: 0
MYALGIAS: 0
NUMBNESS: 0
SHORTNESS OF BREATH: 0
ARTHRALGIAS: 1
POLYPHAGIA: 0
FLANK PAIN: 0
DIARRHEA: 0
CHEST TIGHTNESS: 0
VOMITING: 0
RECTAL PAIN: 0
PALPITATIONS: 0
APPETITE CHANGE: 0
POLYDIPSIA: 0
FATIGUE: 0

## 2025-03-28 ASSESSMENT — ACTIVITIES OF DAILY LIVING (ADL)
MANAGING_FINANCES: INDEPENDENT
DOING_HOUSEWORK: INDEPENDENT
DRESSING: INDEPENDENT
BATHING: INDEPENDENT
TAKING_MEDICATION: INDEPENDENT
GROCERY_SHOPPING: INDEPENDENT

## 2025-03-28 ASSESSMENT — PATIENT HEALTH QUESTIONNAIRE - PHQ9
2. FEELING DOWN, DEPRESSED OR HOPELESS: NOT AT ALL
1. LITTLE INTEREST OR PLEASURE IN DOING THINGS: NOT AT ALL
SUM OF ALL RESPONSES TO PHQ9 QUESTIONS 1 AND 2: 0

## 2025-03-28 NOTE — ASSESSMENT & PLAN NOTE
eGFR 53  5/21/24  Negative proteinuria 5/21/24    Has been taking aldactone 100 mg twice a day    Minimal edema on physical exam  Reported BP control at home 124 80  Will order labs today and based on results:    Consider adding ace/arb -renal protective , adjusting aldactone dose    Orders:    Urine Microalbumin - Lab collect; Future

## 2025-03-28 NOTE — PROGRESS NOTES
Subjective   Reason for Visit: Yanelis Lagunas is an 74 y.o. female here for a Medicare Wellness visit and routine annual exam. . Overall feels well. Active watching grandchildren. BP at home 124/80, leg swelling controlled with twice daily spironolactone, occasional hip discomfort- endorses not bothersome enough to take anything or prevent ADL. Declines colonoscopy- states has never had and does not want. Reviewed risk and benefit. Declines. Agrees to cologuard. Thyroid has been controlled with current dose of medication, denies symptoms. Denies cp, sob, dizziness, cough, change in appetite or activity.    Past Medical, Surgical, and Family History reviewed and updated in chart.    Reviewed all medications by prescribing practitioner or clinical pharmacist (such as prescriptions, OTCs, herbal therapies and supplements) and documented in the medical record.    [facility]    Patient Care Team:  MADY Martinez-CNP as PCP - General (Internal Medicine)  Maryan Roberts DO as PCP - [employer] PCP     Review of Systems   Constitutional:  Negative for activity change, appetite change, chills, fatigue and fever.   Eyes:  Negative for visual disturbance.   Respiratory:  Negative for cough, chest tightness and shortness of breath.    Cardiovascular:  Positive for leg swelling (chronic, better). Negative for chest pain and palpitations.   Gastrointestinal:  Negative for abdominal pain, blood in stool, constipation, diarrhea, nausea, rectal pain and vomiting.   Endocrine: Negative for polydipsia, polyphagia and polyuria.   Genitourinary:  Negative for decreased urine volume, difficulty urinating, flank pain, pelvic pain and vaginal bleeding.   Musculoskeletal:  Positive for arthralgias (right hip pain on occasion, better). Negative for myalgias.   Skin:  Negative for rash.   Neurological:  Negative for dizziness, seizures, syncope, speech difficulty, weakness and numbness.   Psychiatric/Behavioral:   Negative for decreased concentration.        Objective   Vitals:  /80 Comment: states home BP reading 124 80  Pulse 54   Temp 36.8 °C (98.3 °F)   Wt 81.1 kg (178 lb 12.8 oz)   SpO2 98%   BMI 30.69 kg/m²       Physical Exam  Constitutional:       Appearance: Normal appearance. She is not toxic-appearing or diaphoretic.   HENT:      Head: Normocephalic and atraumatic.      Right Ear: External ear normal.      Left Ear: External ear normal.   Neck:      Thyroid: No thyroid tenderness.   Cardiovascular:      Rate and Rhythm: Normal rate and regular rhythm.      Pulses:           Dorsalis pedis pulses are 2+ on the right side and 2+ on the left side.   Pulmonary:      Effort: Pulmonary effort is normal. No tachypnea or bradypnea.      Breath sounds: Normal breath sounds and air entry. No decreased air movement. No decreased breath sounds, wheezing or rhonchi.   Abdominal:      General: Bowel sounds are normal.      Palpations: Abdomen is soft.      Tenderness: There is no abdominal tenderness.   Musculoskeletal:      Right lower leg: Edema (minimal ankle on exam) present.      Left lower leg: Edema (minimal ankle on exam) present.   Lymphadenopathy:      Cervical: No cervical adenopathy.      Right cervical: No superficial cervical adenopathy.     Left cervical: No superficial cervical adenopathy.   Skin:     General: Skin is warm and dry.      Findings: No rash or wound.   Neurological:      Mental Status: She is alert and oriented to person, place, and time.   Psychiatric:         Mood and Affect: Mood normal.         Behavior: Behavior normal.         Assessment & Plan  Routine general medical examination at health care facility    Orders:    Comprehensive Metabolic Panel; Future    Hemoglobin A1C; Future    CKD stage 3a, GFR 45-59 ml/min (Multi)    eGFR 53  5/21/24  Negative proteinuria 5/21/24    Has been taking aldactone 100 mg twice a day    Minimal edema on physical exam  Reported BP control at home  124 80  Will order labs today and based on results:    Consider adding ace/arb -renal protective , adjusting aldactone dose    Orders:    Urine Microalbumin - Lab collect; Future           Other specified hypothyroidism      Labs ordered     Continue levothyroxine 100 mcg daily    BMI 30.0-30.9,adult         Screening for hyperlipidemia    Pravastatin 80 mg daily  LDL 80 5/21/24  Orders:    Lipid Panel; Future    Diabetes mellitus screening      Labs ordered      Right hip pain  Not bothering enough to take medicine   Declines intervention at present  Not limiting ADL   States has improved since last year         Visit for screening mammogram         Encounter for screening mammogram for breast cancer    Orders:    BI mammo bilateral screening tomosynthesis; Future    Screening for colorectal cancer    Orders:    Cologuard®; Future    Hyperlipidemia, unspecified hyperlipidemia type    Orders:    pravastatin (Pravachol) 80 mg tablet; Take 1 tablet (80 mg) by mouth once daily.    Stage 3a chronic kidney disease (Multi)    Orders:    spironolactone (Aldactone) 100 mg tablet; Take 1 tablet (100 mg) by mouth 2 times a day.

## 2025-03-29 LAB
EST. AVERAGE GLUCOSE BLD GHB EST-MCNC: 128 MG/DL
EST. AVERAGE GLUCOSE BLD GHB EST-SCNC: 7.1 MMOL/L
HBA1C MFR BLD: 6.1 % OF TOTAL HGB

## 2025-03-31 LAB
ALBUMIN SERPL-MCNC: 4.6 G/DL (ref 3.6–5.1)
ALBUMIN/CREAT UR: 3 MG/G CREAT
ALP SERPL-CCNC: 71 U/L (ref 37–153)
ALT SERPL-CCNC: 25 U/L (ref 6–29)
ANION GAP SERPL CALCULATED.4IONS-SCNC: 11 MMOL/L (CALC) (ref 7–17)
AST SERPL-CCNC: 20 U/L (ref 10–35)
BILIRUB SERPL-MCNC: 1.8 MG/DL (ref 0.2–1.2)
BUN SERPL-MCNC: 15 MG/DL (ref 7–25)
CALCIUM SERPL-MCNC: 10 MG/DL (ref 8.6–10.4)
CHLORIDE SERPL-SCNC: 102 MMOL/L (ref 98–110)
CHOLEST SERPL-MCNC: 158 MG/DL
CHOLEST/HDLC SERPL: 3.2 (CALC)
CO2 SERPL-SCNC: 26 MMOL/L (ref 20–32)
CREAT SERPL-MCNC: 1.09 MG/DL (ref 0.6–1)
CREAT UR-MCNC: 149 MG/DL (ref 20–275)
EGFRCR SERPLBLD CKD-EPI 2021: 54 ML/MIN/1.73M2
GLUCOSE SERPL-MCNC: 116 MG/DL (ref 65–99)
HDLC SERPL-MCNC: 50 MG/DL
LDLC SERPL CALC-MCNC: 86 MG/DL (CALC)
MICROALBUMIN UR-MCNC: 0.5 MG/DL
NONHDLC SERPL-MCNC: 108 MG/DL (CALC)
POTASSIUM SERPL-SCNC: 4.5 MMOL/L (ref 3.5–5.3)
PROT SERPL-MCNC: 7.2 G/DL (ref 6.1–8.1)
SODIUM SERPL-SCNC: 139 MMOL/L (ref 135–146)
TRIGL SERPL-MCNC: 127 MG/DL

## 2025-04-03 DIAGNOSIS — E03.9 HYPOTHYROIDISM, UNSPECIFIED TYPE: ICD-10-CM

## 2025-04-06 RX ORDER — LEVOTHYROXINE SODIUM 100 UG/1
TABLET ORAL
Qty: 94 TABLET | Refills: 0 | Status: SHIPPED | OUTPATIENT
Start: 2025-04-06

## 2025-04-15 LAB — NONINV COLON CA DNA+OCC BLD SCRN STL QL: NEGATIVE

## 2025-08-24 DIAGNOSIS — E03.9 HYPOTHYROIDISM, UNSPECIFIED TYPE: ICD-10-CM

## 2025-08-26 ENCOUNTER — PATIENT MESSAGE (OUTPATIENT)
Dept: PRIMARY CARE | Facility: CLINIC | Age: 75
End: 2025-08-26
Payer: MEDICARE

## 2025-08-26 DIAGNOSIS — E03.9 ACQUIRED HYPOTHYROIDISM: ICD-10-CM

## 2025-08-26 DIAGNOSIS — N18.31 CKD STAGE 3A, GFR 45-59 ML/MIN (MULTI): Primary | ICD-10-CM

## 2025-08-26 RX ORDER — LEVOTHYROXINE SODIUM 100 UG/1
TABLET ORAL
Qty: 30 TABLET | Refills: 0 | Status: SHIPPED | OUTPATIENT
Start: 2025-08-26

## 2025-09-24 ENCOUNTER — APPOINTMENT (OUTPATIENT)
Dept: PRIMARY CARE | Facility: CLINIC | Age: 75
End: 2025-09-24
Payer: MEDICARE